# Patient Record
(demographics unavailable — no encounter records)

---

## 2025-02-24 NOTE — ADDENDUM
[FreeTextEntry1] : Next Visit: PVR, Uroflow  Entered by Neisha Miller, acting as scribe and chaperone for Dr. Cj Eller.   The documentation recorded by the scribe accurately reflects the service I personally performed and the decisions made by me.

## 2025-02-24 NOTE — ASSESSMENT
[FreeTextEntry1] : Patient presents with bothersome nocturia. Physical exam revealed an enlarged prostate. He is voiding adequately with mild post void residual. His clinical picture is consistent with OAB secondary to BPH. We will start him on nightly tolterodine 2mg and he will continue with daily tamsulosin 0.4mg. We also discussed bladder training techniques to decrease his nocturia. If all remains stable, he will follow-up in 3 months to reevaluate.

## 2025-02-24 NOTE — LETTER BODY
[Dear  ___] : Dear  [unfilled], [Consult Letter:] : I had the pleasure of evaluating your patient, [unfilled]. [Please see my note below.] : Please see my note below. [Consult Closing:] : Thank you very much for allowing me to participate in the care of this patient.  If you have any questions, please do not hesitate to contact me. [Sincerely,] : Sincerely, [FreeTextEntry3] : Cj

## 2025-02-24 NOTE — HISTORY OF PRESENT ILLNESS
[FreeTextEntry1] : Patient is a 65 year old  male who presents for a urological evaluation. He had an episode of testicular pain 1 month ago, denying hematuria or dysuria at that time - this resolved on its own. Patient is voiding with an adequate stream, with some frequency and bothersome nocturia x 2-3. He denies urge incontinence, gross hematuria, dysuria, fever or flank pain. He has been on tamsulosin 0.4mg for about 2-3 years. He reports adequate erectile function. Testosterone on 2/12/24 was 788ng/ml; PSA on was 0.90ng/ml